# Patient Record
Sex: FEMALE | Race: WHITE | NOT HISPANIC OR LATINO | URBAN - METROPOLITAN AREA
[De-identification: names, ages, dates, MRNs, and addresses within clinical notes are randomized per-mention and may not be internally consistent; named-entity substitution may affect disease eponyms.]

---
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
## 2024-02-14 ENCOUNTER — EMERGENCY (EMERGENCY)
Facility: HOSPITAL | Age: 19
LOS: 1 days | Discharge: HOME | End: 2024-02-14
Attending: STUDENT IN AN ORGANIZED HEALTH CARE EDUCATION/TRAINING PROGRAM
Payer: COMMERCIAL

## 2024-02-14 VITALS
WEIGHT: 164.91 LBS | SYSTOLIC BLOOD PRESSURE: 127 MMHG | HEART RATE: 101 BPM | TEMPERATURE: 98 F | HEIGHT: 64 IN | DIASTOLIC BLOOD PRESSURE: 81 MMHG | RESPIRATION RATE: 18 BRPM | OXYGEN SATURATION: 100 %

## 2024-02-14 LAB
ALBUMIN SERPL ELPH-MCNC: 4.6 G/DL — SIGNIFICANT CHANGE UP (ref 3.3–5)
ALP SERPL-CCNC: 44 U/L — SIGNIFICANT CHANGE UP (ref 40–120)
ALT FLD-CCNC: 8 U/L — LOW (ref 10–45)
ANION GAP SERPL CALC-SCNC: 12 MMOL/L — SIGNIFICANT CHANGE UP (ref 5–17)
ANISOCYTOSIS BLD QL: SLIGHT — SIGNIFICANT CHANGE UP
APPEARANCE UR: CLEAR — SIGNIFICANT CHANGE UP
AST SERPL-CCNC: 13 U/L — SIGNIFICANT CHANGE UP (ref 10–40)
BASO STIPL BLD QL SMEAR: PRESENT — SIGNIFICANT CHANGE UP
BASOPHILS # BLD AUTO: 0 K/UL — SIGNIFICANT CHANGE UP (ref 0–0.2)
BASOPHILS NFR BLD AUTO: 0 % — SIGNIFICANT CHANGE UP (ref 0–2)
BILIRUB SERPL-MCNC: 2.8 MG/DL — HIGH (ref 0.2–1.2)
BILIRUB UR-MCNC: NEGATIVE — SIGNIFICANT CHANGE UP
BUN SERPL-MCNC: 10 MG/DL — SIGNIFICANT CHANGE UP (ref 7–23)
CALCIUM SERPL-MCNC: 9.7 MG/DL — SIGNIFICANT CHANGE UP (ref 8.4–10.5)
CHLORIDE SERPL-SCNC: 102 MMOL/L — SIGNIFICANT CHANGE UP (ref 96–108)
CO2 SERPL-SCNC: 26 MMOL/L — SIGNIFICANT CHANGE UP (ref 22–31)
COLOR SPEC: YELLOW — SIGNIFICANT CHANGE UP
CREAT SERPL-MCNC: 0.79 MG/DL — SIGNIFICANT CHANGE UP (ref 0.5–1.3)
DIFF PNL FLD: NEGATIVE — SIGNIFICANT CHANGE UP
EGFR: 110 ML/MIN/1.73M2 — SIGNIFICANT CHANGE UP
ELLIPTOCYTES BLD QL SMEAR: SLIGHT — SIGNIFICANT CHANGE UP
EOSINOPHIL # BLD AUTO: 0 K/UL — SIGNIFICANT CHANGE UP (ref 0–0.5)
EOSINOPHIL NFR BLD AUTO: 0 % — SIGNIFICANT CHANGE UP (ref 0–6)
GLUCOSE SERPL-MCNC: 92 MG/DL — SIGNIFICANT CHANGE UP (ref 70–99)
GLUCOSE UR QL: NEGATIVE MG/DL — SIGNIFICANT CHANGE UP
HCG SERPL-ACNC: <2 MIU/ML — SIGNIFICANT CHANGE UP
HCT VFR BLD CALC: 35.3 % — SIGNIFICANT CHANGE UP (ref 34.5–45)
HGB BLD-MCNC: 11 G/DL — LOW (ref 11.5–15.5)
HYPOCHROMIA BLD QL: SIGNIFICANT CHANGE UP
KETONES UR-MCNC: NEGATIVE MG/DL — SIGNIFICANT CHANGE UP
LEUKOCYTE ESTERASE UR-ACNC: NEGATIVE — SIGNIFICANT CHANGE UP
LIDOCAIN IGE QN: 28 U/L — SIGNIFICANT CHANGE UP (ref 7–60)
LYMPHOCYTES # BLD AUTO: 2.75 K/UL — SIGNIFICANT CHANGE UP (ref 1–3.3)
LYMPHOCYTES # BLD AUTO: 25.2 % — SIGNIFICANT CHANGE UP (ref 13–44)
MANUAL SMEAR VERIFICATION: SIGNIFICANT CHANGE UP
MCHC RBC-ENTMCNC: 19.6 PG — LOW (ref 27–34)
MCHC RBC-ENTMCNC: 31.2 GM/DL — LOW (ref 32–36)
MCV RBC AUTO: 62.9 FL — LOW (ref 80–100)
MICROCYTES BLD QL: SIGNIFICANT CHANGE UP
MONOCYTES # BLD AUTO: 0.48 K/UL — SIGNIFICANT CHANGE UP (ref 0–0.9)
MONOCYTES NFR BLD AUTO: 4.4 % — SIGNIFICANT CHANGE UP (ref 2–14)
NEUTROPHILS # BLD AUTO: 7.69 K/UL — HIGH (ref 1.8–7.4)
NEUTROPHILS NFR BLD AUTO: 70.4 % — SIGNIFICANT CHANGE UP (ref 43–77)
NITRITE UR-MCNC: NEGATIVE — SIGNIFICANT CHANGE UP
OVALOCYTES BLD QL SMEAR: SLIGHT — SIGNIFICANT CHANGE UP
PH UR: 6 — SIGNIFICANT CHANGE UP (ref 5–8)
PLAT MORPH BLD: NORMAL — SIGNIFICANT CHANGE UP
PLATELET # BLD AUTO: 350 K/UL — SIGNIFICANT CHANGE UP (ref 150–400)
POIKILOCYTOSIS BLD QL AUTO: SIGNIFICANT CHANGE UP
POLYCHROMASIA BLD QL SMEAR: SLIGHT — SIGNIFICANT CHANGE UP
POTASSIUM SERPL-MCNC: 4 MMOL/L — SIGNIFICANT CHANGE UP (ref 3.5–5.3)
POTASSIUM SERPL-SCNC: 4 MMOL/L — SIGNIFICANT CHANGE UP (ref 3.5–5.3)
PROT SERPL-MCNC: 6.7 G/DL — SIGNIFICANT CHANGE UP (ref 6–8.3)
PROT UR-MCNC: NEGATIVE MG/DL — SIGNIFICANT CHANGE UP
RBC # BLD: 5.61 M/UL — HIGH (ref 3.8–5.2)
RBC # FLD: 15.7 % — HIGH (ref 10.3–14.5)
RBC BLD AUTO: ABNORMAL
SCHISTOCYTES BLD QL AUTO: SLIGHT — SIGNIFICANT CHANGE UP
SODIUM SERPL-SCNC: 140 MMOL/L — SIGNIFICANT CHANGE UP (ref 135–145)
SP GR SPEC: 1.01 — SIGNIFICANT CHANGE UP (ref 1–1.03)
UROBILINOGEN FLD QL: 0.2 MG/DL — SIGNIFICANT CHANGE UP (ref 0.2–1)
WBC # BLD: 10.93 K/UL — HIGH (ref 3.8–10.5)
WBC # FLD AUTO: 10.93 K/UL — HIGH (ref 3.8–10.5)

## 2024-02-14 PROCEDURE — 74177 CT ABD & PELVIS W/CONTRAST: CPT | Mod: 26,MA

## 2024-02-14 PROCEDURE — 76830 TRANSVAGINAL US NON-OB: CPT | Mod: 26

## 2024-02-14 PROCEDURE — 99285 EMERGENCY DEPT VISIT HI MDM: CPT

## 2024-02-14 PROCEDURE — 93975 VASCULAR STUDY: CPT | Mod: 26

## 2024-02-14 RX ORDER — ACETAMINOPHEN 500 MG
975 TABLET ORAL ONCE
Refills: 0 | Status: COMPLETED | OUTPATIENT
Start: 2024-02-14 | End: 2024-02-14

## 2024-02-14 RX ORDER — KETOROLAC TROMETHAMINE 30 MG/ML
15 SYRINGE (ML) INJECTION ONCE
Refills: 0 | Status: DISCONTINUED | OUTPATIENT
Start: 2024-02-14 | End: 2024-02-14

## 2024-02-14 RX ADMIN — Medication 975 MILLIGRAM(S): at 20:38

## 2024-02-14 RX ADMIN — Medication 975 MILLIGRAM(S): at 20:08

## 2024-02-14 NOTE — ED PROVIDER NOTE - PROGRESS NOTE DETAILS
Conor Saunders MD PGY-2: OBGYN saw the patient and states that patient is about to get her period in the follicles because of that.  Patient is okay to go home per Miguelito standpoint.  Will discharge patient at this time.

## 2024-02-14 NOTE — ED PROVIDER NOTE - OBJECTIVE STATEMENT
18-year-old female with no PMH on spironolactone for acne presents for 6 days of intermittent right lower quadrant abdominal pain worse with movement or standing.  Patient's pain was a 10 out of 10 last night at 2 AM and mildly relieved with 2 Aleve.  Right now it is a 5 or 6 out of 10.  Described as dull mostly but sharp at times.  Denies fever, chills, vomiting, chest pain, shortness of breath, dysuria, constipation, diarrhea, vaginal bleeding, vaginal discharge.  Endorses some nausea.  Last menstrual period 2 and half weeks ago.  Patient has sexually active and uses protection.  Not on OCPs.  Went to the urgent care this morning and was tested for STIs and reports negative pregnancy test.  Sent to ED to rule out appendicitis.

## 2024-02-14 NOTE — ED ADULT NURSE NOTE - OBJECTIVE STATEMENT
17 y/o female no PMH, on spironolactone for acne presents to ED from home c/o RLQ pain x 6 days. Pt states pain worsens with movement or standing. Last night pain was severe, took 2 Aleve with some relief. Now reporting 6/10 pain, describes it as sharp. Denying chest pain, SOB, dysuria, constipation, diarrhea, vaginal bleeding. LMP was 25   Last menstrual period 2 and half weeks ago.  Patient has sexually active and uses protection.  Not on OCPs.  Went to the urgent care this morning and was tested for STIs and reports negative pregnancy test.  Sent to ED to rule out appendicitis. 19 y/o female no PMH, on spironolactone for acne presents to ED from home c/o RLQ pain x 6 days. Pt states pain worsens with movement or standing. Last night pain was severe, took 2 Aleve with some relief. Now reporting 6/10 pain, describes it as sharp. Denying chest pain, SOB, dysuria, constipation, diarrhea, vaginal bleeding. LMP was 2 and half weeks ago. Went to urgent care this morning, had STI testing, had negative pregnancy test. Sent to ED to r/o appendicitis. Pt is A&O x 4. Breathing even and unlabored. Gross motor and neuro intact. 20G IV placed in LAC. Safety and comfort provided. Family at bedside.

## 2024-02-14 NOTE — ED PROVIDER NOTE - PHYSICAL EXAMINATION
Vital Signs Stable  Gen: well appearing, NAD  HEENT: no conjunctivitis  Cardiac: regular rate rhythm, normal S1S2  Chest: CTA BL, no wheeze or crackles  Abdomen: normal BS, soft, +RLQ tenderness, no guarding  Extremity: no gross deformity, good perfusion, no LE swelling  Skin: no rash  Neuro: grossly normal

## 2024-02-14 NOTE — ED ADULT TRIAGE NOTE - CHIEF COMPLAINT QUOTE
Patient c/o RLQ abdominal pain x 1 week that worsened the past few days. Sent from urgent care to r/o appendicitis.

## 2024-02-14 NOTE — ED PROVIDER NOTE - NSFOLLOWUPINSTRUCTIONS_ED_ALL_ED_FT
You were evaluated in our emergency department for abdominal pain.  Your results were discussed with you and are attached.  You were evaluated by our gynecology team.  Please follow-up with a gynecologist.  You can expect a phone call to help you make an appointment in the next couple of business days.  You can take Tylenol and ibuprofen for your pain.    You may take 975 mg Tylenol (acetaminophen) every six hours as needed for pain.  You may take 600mg Ibuprofen (Advil) once every 6 hours as needed for pain. See medication label for warnings and use instructions.    Abdominal Pain    Many things can cause abdominal pain. Many times, abdominal pain is not caused by a disease and will improve without treatment. Your health care provider will do a physical exam to determine if there is a dangerous cause of your pain; blood tests and imaging may help determine the cause of your pain. However, in many cases, no cause may be found and you may need further testing as an outpatient. Monitor your abdominal pain for any changes.     SEEK IMMEDIATE MEDICAL CARE IF YOU HAVE ANY OF THE FOLLOWING SYMPTOMS: worsening abdominal pain, uncontrollable vomiting, profuse diarrhea, inability to have bowel movements or pass gas, black or bloody stools, fever accompanying chest pain or back pain, or fainting. These symptoms may represent a serious problem that is an emergency. Do not wait to see if the symptoms will go away. Get medical help right away. Call 911 and do not drive yourself to the hospital.

## 2024-02-14 NOTE — ED PROVIDER NOTE - ATTENDING CONTRIBUTION TO CARE
Attending ARABELLA Cruz performed a history and physical exam of the patient and discussed their management with the resident. I reviewed the resident's note and agree with the documented findings and plan of care, except as noted. My medical decision making and observations are as follows:    18 F with PMH acne on spironolactone presenting for 6 days of intermittent right lower quadrant pain that is worse with movement and standing; referred to ED by urgent care for evaluation of appendicitis.  No fever, chest pain, shortness of breath, cough, vomiting, diarrhea, dysuria, hematuria, vaginal bleeding or discharge.  LMP 2.5 weeks ago.  On exam, patient no acute distress, normal work of breathing, speaking full sentences.  Heart and lungs clear to auscultation, abdomen soft with RLQ tenderness to palpation with no rebound or guarding, no CVA tenderness, no pedal edema.    MDM–well-appearing female presenting with RLQ tenderness syndrome x 1 week, vital signs stable but initial triage vitals show mild tachycardia to 101.  Symptoms may reflect appendicitis, although lower suspicion given duration of symptoms and intermittent nature of pain; will also evaluate for ovarian pathology.  Labs, urinalysis, CT, transvaginal ultrasound.

## 2024-02-14 NOTE — ED PROVIDER NOTE - CLINICAL SUMMARY MEDICAL DECISION MAKING FREE TEXT BOX
18-year-old female with no PMH presents for right lower quadrant abdominal pain x 6 days.  No fevers.  Exam shows right lower quadrant tenderness no guarding.  Normal vital signs.  Will obtain labs, lipase, give analgesia with Tylenol, UA/UC, CT abdomen pelvis to evaluate for appendicitis, will consider transvaginal ultrasound to evaluate for torsion if CTA unremarkable.  Dispo pending results.

## 2024-02-14 NOTE — ED ADULT NURSE NOTE - NSFALLUNIVINTERV_ED_ALL_ED
Bed/Stretcher in lowest position, wheels locked, appropriate side rails in place/Call bell, personal items and telephone in reach/Instruct patient to call for assistance before getting out of bed/chair/stretcher/Non-slip footwear applied when patient is off stretcher/[place] to call system/Physically safe environment - no spills, clutter or unnecessary equipment/Purposeful proactive rounding/Room/bathroom lighting operational, light cord in reach

## 2024-02-15 VITALS
HEART RATE: 90 BPM | DIASTOLIC BLOOD PRESSURE: 70 MMHG | TEMPERATURE: 98 F | RESPIRATION RATE: 18 BRPM | SYSTOLIC BLOOD PRESSURE: 107 MMHG | OXYGEN SATURATION: 100 %

## 2024-02-15 PROCEDURE — 99285 EMERGENCY DEPT VISIT HI MDM: CPT | Mod: 25

## 2024-02-15 PROCEDURE — 96374 THER/PROPH/DIAG INJ IV PUSH: CPT | Mod: XU

## 2024-02-15 PROCEDURE — 80053 COMPREHEN METABOLIC PANEL: CPT

## 2024-02-15 PROCEDURE — 84702 CHORIONIC GONADOTROPIN TEST: CPT

## 2024-02-15 PROCEDURE — 99283 EMERGENCY DEPT VISIT LOW MDM: CPT

## 2024-02-15 PROCEDURE — 93975 VASCULAR STUDY: CPT

## 2024-02-15 PROCEDURE — 83690 ASSAY OF LIPASE: CPT

## 2024-02-15 PROCEDURE — 87086 URINE CULTURE/COLONY COUNT: CPT

## 2024-02-15 PROCEDURE — 81003 URINALYSIS AUTO W/O SCOPE: CPT

## 2024-02-15 PROCEDURE — 85025 COMPLETE CBC W/AUTO DIFF WBC: CPT

## 2024-02-15 PROCEDURE — 74177 CT ABD & PELVIS W/CONTRAST: CPT | Mod: MA

## 2024-02-15 PROCEDURE — 76830 TRANSVAGINAL US NON-OB: CPT

## 2024-02-15 RX ADMIN — Medication 15 MILLIGRAM(S): at 00:38

## 2024-02-15 RX ADMIN — Medication 15 MILLIGRAM(S): at 00:08

## 2024-02-15 NOTE — CONSULT NOTE ADULT - SUBJECTIVE AND OBJECTIVE BOX
CECIL VENEGAS  18y  Female [id number]    HPI: 17yo G0, LMP 2024, who presents for intermittent RLQ pain for the past week. Notes the pain as worse with certain positions, but ameliorated with others (rest/supine). Said she had some nausea yesterday, but no vomiting. Denies any fevers, chills, dysuria, issues with BMs, chest pain, shortness of breath, abnormal vaginal discharge, abnormal vaginal bleeding, or any other complaints.      Name of Ob/Gyn Physician: Does not have one     POB: G0   PGyn: Denies  - Intermittently sexually active and was sexually active several weeks ago   MedHx: Acne  SurgHx: Denies  Meds: Spironolactone   Allergies: PCN (hives as a child)   Social: Denies any tobacco use, drug use, or alcohol use   - Is studying [employer] in college. Is a Jr    Vital Signs Last 24 Hrs  T(C): 36.8 (2024 22:21), Max: 36.8 (2024 17:07)  T(F): 98.3 (2024 22:21), Max: 98.3 (2024 22:21)  HR: 88 (2024 22:21) (88 - 101)  BP: 106/67 (2024 22:21) (106/67 - 127/81)  BP(mean): --  RR: 18 (2024 22:21) (18 - 18)  SpO2: 100% (2024 22:21) (100% - 100%)    Parameters below as of 2024 22:21  Patient On (Oxygen Delivery Method): room air        Physical Exam:   General: Awake. Alert. No acute distress   Lungs: Unlabored breathing. No respiratory distress   Abd: Soft. Non-tender. Minimal RLQ tenderness. No rebound or guarding  - Patient observed to be ambulating, change positions, and move around without any discomfort  - No pain elicited with jostling of stretcher    (w/ Chaperone MICKEY Wade):  External vulva and labia w/o lesions or skin changes seen. No CMT. Right adnexal tenderness and fullness. No left adnexal tenderness   Ext: No calf tenderness bilaterally    LABS:                              11.0   10.93 )-----------( 350      ( 2024 20:15 )             35.3         140  |  102  |  10  ----------------------------<  92  4.0   |  26  |  0.79    Ca    9.7      2024 20:15    TPro  6.7  /  Alb  4.6  /  TBili  2.8<H>  /  DBili  x   /  AST  13  /  ALT  8<L>  /  AlkPhos  44      I&O's Detail      Urinalysis Basic - ( 2024 20:15 )    Color: Yellow / Appearance: Clear / S.006 / pH: x  Gluc: 92 mg/dL / Ketone: Negative mg/dL  / Bili: Negative / Urobili: 0.2 mg/dL   Blood: x / Protein: Negative mg/dL / Nitrite: Negative   Leuk Esterase: Negative / RBC: x / WBC x   Sq Epi: x / Non Sq Epi: x / Bacteria: x        RADIOLOGY & ADDITIONAL STUDIES:    < from: US Doppler Pelvis (24 @ 23:00) >    ACC: [id number] EXAM:  US DPLX PELVIC   ORDERED BY:  SALBADOR RUBY     ACC: [id number] EXAM:  US TRANSVAGINAL   ORDERED BY:  SALBADOR RUBY     PROCEDURE DATE:  2024          INTERPRETATION:  CLINICAL INFORMATION: Right lower quadrant tenderness.   Evaluate for ovarian torsion    LMP: 2024    COMPARISON: Same day CT abdomen and pelvis.    TECHNIQUE:  Endovaginal pelvic sonogram only. Color and Spectral Doppler was   performed.    FINDINGS:  Uterus: 7.2 cm x 3.5 cm x 4.2 cm. Within normallimits.  Endometrium: 5 mm. Within normal limits.    Right ovary: 6.3 cm x 4.3 cm x 6.2 cm. Right ovarian cyst with reticular   echoes measuring 5.6 x 3.5 x 5.6 cm. Normal arterial and venous waveforms.  Left ovary: 2.7 cm x 1.6 cm x 1.8 cm. Within normal limits. Normal   arterial and venous waveforms.    Fluid: None.    IMPRESSION:  5.6 cm right ovarian hemorrhagic cyst.    No sonographic evidence for ovarian torsion. There was flow to the   bilateral ovaries at the time of scanning.    --- End of Report ---           JES DE LEON MD; Resident Radiologist  This document has been electronically signed.   PIOTR POON MD; Attending Radiologist  This document has been electronically signed. 2024 11:29PM    < end of copied text >      < from: CT Abdomen and Pelvis w/ IV Cont (24 @ 20:32) >    ACC: [id number] EXAM:  CT ABDOMEN AND PELVIS IC   ORDERED BY:  SALBADOR RUBY     PROCEDURE DATE:  2024          INTERPRETATION:  CLINICAL INFORMATION: Right lower quadrant tenderness,   question appendicitis.    COMPARISON: None.    CONTRAST/COMPLICATIONS:  IV Contrast: Omnipaque 350  90 cc administered   10 cc discarded  Oral Contrast: NONE  Complications: None reported at time of study completion    PROCEDURE:  CT of the Abdomen and Pelvis was performed.  Sagittal and coronal reformats were performed.    FINDINGS:  LOWER CHEST: Within normal limits.    LIVER: Within normal limits.  BILE DUCTS: Normal caliber.  GALLBLADDER: Within normal limits.  SPLEEN: Within normal limits.  PANCREAS: Within normal limits.  ADRENALS: Within normal limits.  KIDNEYS/URETERS: Right upper pole subcentimeter hypodensity, too small to   characterize. Kidneys enhance symmetrically. No hydronephrosis. No   obstructing nephrolithiasis.    BLADDER: Within normal limits.  REPRODUCTIVE ORGANS: There is a 6.6 cm pelvic cyst in the region of the   right adnexa appears to arise from the right ovary. Uterus and adnexa are   otherwise within normal limits.    BOWEL: No bowel obstruction. Under distended left colon limiting complete   evaluation. Appendix isnormal.  PERITONEUM: No ascites.  VESSELS: Within normal limits.  RETROPERITONEUM/LYMPH NODES: No lymphadenopathy.  ABDOMINAL WALL: Within normal limits.  BONES: Within normal limits.    IMPRESSION:  Normal appendix.    6.6 cm pelvic cyst in the region of the right adnexa/posterior cul-de-sac   which appears to arise from the right ovary. Further evaluation by pelvic   ultrasound is recommended. Diagnostic considerations include   ovarian/paraovarian cyst, foregut duplication cyst, lymphangioma, or   lymphocele. Cystic ovarian neoplasms are felt to be much less likely,   though not entirely excluded.    --- End of Report ---           SARBJIT MITCHELL MD; Resident Radiologist  This document has been electronically signed.   PIOTR POON MD;Attending Radiologist  This document has been electronically signed. Feb 14 2024  9:15PM    < end of copied text >

## 2024-02-15 NOTE — CONSULT NOTE ADULT - ATTENDING COMMENTS
/Attendiny/o G0, LMP 24, who presents to the ED w/ c/o RLQ pain x1 week.  Pt was discussed with me; chart reviewed; pt seen at bedside w/ the above Resident and I agree with the above assessment and plan.    Pt c/o RLQ pain that is dull, intermittent, and positional and is improved when laying down.  Pt says she was evaluated in Urgent care and was told to be evaluated by the ED. Denies any other complaints. Denies any vagina bleeding or discharge; denies any urinary or GI complaints.  Pt says that her menses are regular and usually has low back pain during her menses.    Currently sexually active w/ last intercourse a few weeks ago; denies any birth control use and has not seen a Gyn.  Denies any sig PmHx.    VSS, afebrile  Pt appears in no apparent distress and resting comfortably in the stretcher.  Abd: Soft, mild RLQ tend on deep palpation;  no R/G  Benign Gyn exam    Labs:  WBC: 10.93; H/H: 11/35.3; Plt: 350    CT: 6.6cm right adnexal cyst  Sono: nl uterus and LOV; ROV w/ 5.6cm hemorrhagic cyst.    A/P  -17y/o G0, LMP 24, w/ c/o intermittent dull RLQ pain, who was found to have a Right Ovarian 5.6cm cyst.  -Pt does not have surgical abdomen and no clinical evidence of ovarian torsion at this time.  -Right ovarian cyst most likely physiological as pt is pre-menstrual and her menses is due within the next week.  -Recommend NSAIDs for pain management as needed and pt should f/u with a Gyn to monitor the cyst.  -Pt is stable for D/C home with pain precautions given.    Thank you for the consult; please call if any further questions.    Dr. Granado

## 2024-02-15 NOTE — CONSULT NOTE ADULT - ASSESSMENT
17yo G0, LMP 1/27/2024, who presents with intermittent RLQ pain with diagnostic work-up demonstrating a 5.6cm right-sided ovarian cyst. Patient overall with reassuring physical exam, VS, and imaging demonstrating flow to bilaterally without evidence of torsion. Given aforementioned, low concern for torsion and patient does not have a surgical abdomen. Reviewed with patient and mother at bedside torsion return precautions and the importance of GYN follow-up for further monitoring and management of the ovarian cyst given its size    Recommendations    Franko Stringer  PGY-2, Obstetrics & Gynecology     d/w     ***Incomplete note***  17yo G0, LMP 1/27/2024, who presents with intermittent RLQ pain with diagnostic work-up demonstrating a 5.6cm right-sided ovarian cyst. Patient overall with reassuring physical exam, VS, and imaging demonstrating flow to bilaterally without evidence of torsion. Given aforementioned, low concern for torsion and patient does not have a surgical abdomen. Reviewed with patient and mother at bedside torsion return precautions and the importance of GYN follow-up for further monitoring and management of the ovarian cyst given its size. Additionally reviewed that cyst is likely physiologic and menses is imminent     Recommendations  - No acute gyn interventions at this time  - Patient appropriate for dc. Recommended pain control and outpatient GN follow-up  - Remainder of care per ED     Franko Stringer  PGY-2, Obstetrics & Gynecology     seen and re-evaluated with Dr. TAY Koenig

## 2024-02-16 LAB
CULTURE RESULTS: SIGNIFICANT CHANGE UP
SPECIMEN SOURCE: SIGNIFICANT CHANGE UP

## 2024-03-11 NOTE — ED PEDIATRIC NURSE NOTE - NS CRAFFT CAR ALCOHOL
Contact:  Phone number: [phone] (mobile).   Name of person spoken with and relationship to patient: JULY Jean-Baptiste (self)  Patient’s Adherence:  How patient is doing on medication: Well   How many missed doses and reason: 0 N/A   Any new medications: No   Any new conditions: No   Any new allergies: No   Any new side effects: No   Any new diagnoses: No   How many doses remaining: a week supply    Did patient want to speak with pharmacist: No  Delivery:  Delivery date and method: 3/13/24 via [employer]   Needs by Date: 3/15/24   Signature required: No    Any additional details for : N/A  Teach Appointment Date:  N/A  Shipping Address:  [address] [place] [address]   [place] [address]  Medication(name,strength and dose):  mercaptopurine (PURINETHOL) 50 MG Tab, methotrexate 2.5 MG tablet  Copay:  $0  Payment Method:  n/a $0 copay  Supplies:  NA  Additional Information:  NA  
No

## 2024-08-17 ENCOUNTER — EMERGENCY (EMERGENCY)
Facility: HOSPITAL | Age: 19
LOS: 1 days | Discharge: HOME | End: 2024-08-17
Attending: EMERGENCY MEDICINE | Admitting: EMERGENCY MEDICINE
Payer: COMMERCIAL

## 2024-08-17 VITALS
WEIGHT: 181.88 LBS | DIASTOLIC BLOOD PRESSURE: 70 MMHG | TEMPERATURE: 98 F | SYSTOLIC BLOOD PRESSURE: 105 MMHG | HEART RATE: 77 BPM | OXYGEN SATURATION: 96 % | RESPIRATION RATE: 20 BRPM | HEIGHT: 64 IN

## 2024-08-17 LAB
ALBUMIN SERPL ELPH-MCNC: 4.2 G/DL — SIGNIFICANT CHANGE UP (ref 3.3–5)
ALP SERPL-CCNC: 49 U/L — SIGNIFICANT CHANGE UP (ref 30–120)
ALT FLD-CCNC: 13 U/L — SIGNIFICANT CHANGE UP (ref 10–60)
ANION GAP SERPL CALC-SCNC: 4 MMOL/L — LOW (ref 5–17)
ANISOCYTOSIS BLD QL: SLIGHT — SIGNIFICANT CHANGE UP
AST SERPL-CCNC: 16 U/L — SIGNIFICANT CHANGE UP (ref 10–40)
BASOPHILS # BLD AUTO: 0.06 K/UL — SIGNIFICANT CHANGE UP (ref 0–0.2)
BASOPHILS NFR BLD AUTO: 0.7 % — SIGNIFICANT CHANGE UP (ref 0–2)
BILIRUB SERPL-MCNC: 2.4 MG/DL — HIGH (ref 0.2–1.2)
BUN SERPL-MCNC: 15 MG/DL — SIGNIFICANT CHANGE UP (ref 7–23)
CALCIUM SERPL-MCNC: 9.4 MG/DL — SIGNIFICANT CHANGE UP (ref 8.4–10.5)
CHLORIDE SERPL-SCNC: 102 MMOL/L — SIGNIFICANT CHANGE UP (ref 96–108)
CO2 SERPL-SCNC: 30 MMOL/L — SIGNIFICANT CHANGE UP (ref 22–31)
CREAT SERPL-MCNC: 0.86 MG/DL — SIGNIFICANT CHANGE UP (ref 0.5–1.3)
D DIMER BLD IA.RAPID-MCNC: <150 NG/ML DDU — SIGNIFICANT CHANGE UP
DACRYOCYTES BLD QL SMEAR: SLIGHT — SIGNIFICANT CHANGE UP
EGFR: 99 ML/MIN/1.73M2 — SIGNIFICANT CHANGE UP
EOSINOPHIL # BLD AUTO: 0.16 K/UL — SIGNIFICANT CHANGE UP (ref 0–0.5)
EOSINOPHIL NFR BLD AUTO: 1.9 % — SIGNIFICANT CHANGE UP (ref 0–6)
FLUAV AG NPH QL: SIGNIFICANT CHANGE UP
FLUBV AG NPH QL: SIGNIFICANT CHANGE UP
GLUCOSE SERPL-MCNC: 84 MG/DL — SIGNIFICANT CHANGE UP (ref 70–99)
HCG UR QL: NEGATIVE — SIGNIFICANT CHANGE UP
HCT VFR BLD CALC: 35.8 % — SIGNIFICANT CHANGE UP (ref 34.5–45)
HGB BLD-MCNC: 11.2 G/DL — LOW (ref 11.5–15.5)
HOWELL-JOLLY BOD BLD QL SMEAR: PRESENT — SIGNIFICANT CHANGE UP
IMM GRANULOCYTES NFR BLD AUTO: 0.2 % — SIGNIFICANT CHANGE UP (ref 0–0.9)
LYMPHOCYTES # BLD AUTO: 3.05 K/UL — SIGNIFICANT CHANGE UP (ref 1–3.3)
LYMPHOCYTES # BLD AUTO: 35.7 % — SIGNIFICANT CHANGE UP (ref 13–44)
MANUAL SMEAR VERIFICATION: SIGNIFICANT CHANGE UP
MCHC RBC-ENTMCNC: 19.6 PG — LOW (ref 27–34)
MCHC RBC-ENTMCNC: 31.3 GM/DL — LOW (ref 32–36)
MCV RBC AUTO: 62.8 FL — LOW (ref 80–100)
MICROCYTES BLD QL: SIGNIFICANT CHANGE UP
MONOCYTES # BLD AUTO: 0.55 K/UL — SIGNIFICANT CHANGE UP (ref 0–0.9)
MONOCYTES NFR BLD AUTO: 6.4 % — SIGNIFICANT CHANGE UP (ref 2–14)
NEUTROPHILS # BLD AUTO: 4.71 K/UL — SIGNIFICANT CHANGE UP (ref 1.8–7.4)
NEUTROPHILS NFR BLD AUTO: 55.1 % — SIGNIFICANT CHANGE UP (ref 43–77)
NRBC # BLD: 0 /100 WBCS — SIGNIFICANT CHANGE UP (ref 0–0)
OVALOCYTES BLD QL SMEAR: SLIGHT — SIGNIFICANT CHANGE UP
PLAT MORPH BLD: NORMAL — SIGNIFICANT CHANGE UP
PLATELET # BLD AUTO: 369 K/UL — SIGNIFICANT CHANGE UP (ref 150–400)
PLATELET COUNT - ESTIMATE: NORMAL — SIGNIFICANT CHANGE UP
POTASSIUM SERPL-MCNC: 3.9 MMOL/L — SIGNIFICANT CHANGE UP (ref 3.5–5.3)
POTASSIUM SERPL-SCNC: 3.9 MMOL/L — SIGNIFICANT CHANGE UP (ref 3.5–5.3)
PROT SERPL-MCNC: 7.1 G/DL — SIGNIFICANT CHANGE UP (ref 6–8.3)
RBC # BLD: 5.7 M/UL — HIGH (ref 3.8–5.2)
RBC # FLD: 14.9 % — HIGH (ref 10.3–14.5)
RBC BLD AUTO: ABNORMAL
RSV RNA NPH QL NAA+NON-PROBE: SIGNIFICANT CHANGE UP
SARS-COV-2 RNA SPEC QL NAA+PROBE: SIGNIFICANT CHANGE UP
SODIUM SERPL-SCNC: 136 MMOL/L — SIGNIFICANT CHANGE UP (ref 135–145)
WBC # BLD: 8.55 K/UL — SIGNIFICANT CHANGE UP (ref 3.8–10.5)
WBC # FLD AUTO: 8.55 K/UL — SIGNIFICANT CHANGE UP (ref 3.8–10.5)

## 2024-08-17 PROCEDURE — 36415 COLL VENOUS BLD VENIPUNCTURE: CPT

## 2024-08-17 PROCEDURE — 87637 SARSCOV2&INF A&B&RSV AMP PRB: CPT

## 2024-08-17 PROCEDURE — 71046 X-RAY EXAM CHEST 2 VIEWS: CPT

## 2024-08-17 PROCEDURE — 80053 COMPREHEN METABOLIC PANEL: CPT

## 2024-08-17 PROCEDURE — 99285 EMERGENCY DEPT VISIT HI MDM: CPT | Mod: 25

## 2024-08-17 PROCEDURE — 93010 ELECTROCARDIOGRAM REPORT: CPT

## 2024-08-17 PROCEDURE — 81025 URINE PREGNANCY TEST: CPT

## 2024-08-17 PROCEDURE — 99285 EMERGENCY DEPT VISIT HI MDM: CPT

## 2024-08-17 PROCEDURE — 93005 ELECTROCARDIOGRAM TRACING: CPT

## 2024-08-17 PROCEDURE — 85379 FIBRIN DEGRADATION QUANT: CPT

## 2024-08-17 PROCEDURE — 85025 COMPLETE CBC W/AUTO DIFF WBC: CPT

## 2024-08-17 PROCEDURE — 71046 X-RAY EXAM CHEST 2 VIEWS: CPT | Mod: 26
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
## 2024-08-17 NOTE — ED PROVIDER NOTE - IV ALTEPLASE DOOR HIDDEN
show
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
## 2024-08-17 NOTE — ED PROVIDER NOTE - NSFOLLOWUPINSTRUCTIONS_ED_ALL_ED_FT
male Shortness of Breath, Adult  Shortness of breath is when a person has trouble breathing or when a person feels like she or he is having trouble breathing in enough air. Shortness of breath could be a sign of a medical problem.    Follow these instructions at home:  A sign showing that a person should not smoke.  Pollutants    Do not use any products that contain nicotine or tobacco. These products include cigarettes, chewing tobacco, and vaping devices, such as e-cigarettes. This also includes cigars and pipes. If you need help quitting, ask your health care provider.  Avoid things that can irritate your airways, including:  Smoke. This includes campfire smoke, forest fire smoke, and secondhand smoke from tobacco products. Do not smoke or allow others to smoke in your home.  Mold.  Dust.  Air pollution.  Chemical fumes.  Things that can give you an allergic reaction (allergens) if you have allergies. Common allergens include pollen from grasses or trees and animal dander.  Keep your living space clean and free of mold and dust.  General instructions    Pay attention to any changes in your symptoms.  Take over-the-counter and prescription medicines only as told by your health care provider. This includes oxygen therapy and inhaled medicines.  Rest as needed.  Return to your normal activities as told by your health care provider. Ask your health care provider what activities are safe for you.  Keep all follow-up visits. This is important.  Contact a health care provider if:  Your condition does not improve as soon as expected.  You have a hard time doing your normal activities, even after you rest.  You have new symptoms.  You cannot walk up stairs or exercise the way that you normally do.  Get help right away if:  Your shortness of breath gets worse.  You have shortness of breath when you are resting.  You feel light-headed or you faint.  You have a cough that is not controlled with medicines.  You cough up blood.  You have pain with breathing.  You have pain in your chest, arms, shoulders, or abdomen.  You have a fever.  These symptoms may be an emergency. Get help right away. Call 911.  Do not wait to see if the symptoms will go away.  Do not drive yourself to the hospital.  Summary  Shortness of breath is when a person has trouble breathing enough air. It can be a sign of a medical problem.  Avoid things that irritate your lungs, such as smoking, pollution, mold, and dust.  Pay attention to changes in your symptoms and contact your health care provider if you have a hard time completing daily activities because of shortness of breath.  This information is not intended to replace advice given to you by your health care provider. Make sure you discuss any questions you have with your health care provider.
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
## 2024-08-17 NOTE — ED PROVIDER NOTE - CARE PROVIDER_API CALL
Kyle Hernandez  Pediatrics  [address] [place] [address]  Phone: [phone]  Fax: [phone]  Follow Up Time: 1-3 Days

## 2024-08-17 NOTE — ED PROVIDER NOTE - PATIENT PORTAL LINK FT
You can access the FollowMyHealth Patient Portal offered by [facility] by registering at the following website: [email or web address]. By joining [facility]’s FollowMyHealth portal, you will also be able to view your health information using other applications (apps) compatible with our system.

## 2024-08-17 NOTE — ED PROVIDER NOTE - RESPIRATORY, MLM
Breath sounds clear and equal bilaterally. no wheezing, no respiratory distress. talking in full sentences.
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
## 2024-08-17 NOTE — ED PROVIDER NOTE - NSCAREINITIATED _GEN_ER
Management as above.  Initially on BiPAP in the ED, currently on 2 L nasal cannula.   Camilla Benitez(PA)

## 2024-08-18 VITALS
OXYGEN SATURATION: 99 % | TEMPERATURE: 98 F | SYSTOLIC BLOOD PRESSURE: 100 MMHG | HEART RATE: 76 BPM | RESPIRATION RATE: 20 BRPM | DIASTOLIC BLOOD PRESSURE: 69 MMHG

## 2024-10-03 NOTE — ED PEDIATRIC NURSE NOTE - BREATH SOUNDS, LEFT
Gen: No acute distress, non toxic  HEENT: Mucous membranes moist, pink conjunctivae, EOMI  CV: RRR, nl s1/s2.  Resp: CTAB, normal rate and effort  GI: Abdomen soft, NT, ND. No rebound, no guarding  : No CVAT  Neuro: A&O x 3, moving all 4 extremities  MSK: No spine or joint tenderness to palpation  Skin: Diffuse urticarial rash clear

## 2024-10-08 NOTE — ED PROVIDER NOTE - OBJECTIVE STATEMENT
no 17 yo F brought by mom from college with cough sore throat and nasal congestion for 1 week. Has been away at school living in dorms and other roommates have had the same. Pt is not vaccinated against COVID. Denies fever, SOB, NV, CP, abd pain, diarrhea dysuria or pregnancy.

## 2025-02-18 NOTE — ED ADULT NURSE NOTE - MODE OF DISCHARGE
Detail Level: Detailed Quality 226: Preventive Care And Screening: Tobacco Use: Screening And Cessation Intervention: Patient screened for tobacco use and is an ex/non-smoker Ambulatory